# Patient Record
Sex: MALE | Race: WHITE | NOT HISPANIC OR LATINO | Employment: FULL TIME | URBAN - NONMETROPOLITAN AREA
[De-identification: names, ages, dates, MRNs, and addresses within clinical notes are randomized per-mention and may not be internally consistent; named-entity substitution may affect disease eponyms.]

---

## 2022-04-12 RX ORDER — GUANFACINE 4 MG/1
TABLET, EXTENDED RELEASE ORAL
Qty: 30 TABLET | Refills: 5 | OUTPATIENT
Start: 2022-04-12

## 2022-04-15 ENCOUNTER — TELEMEDICINE (OUTPATIENT)
Dept: FAMILY MEDICINE CLINIC | Facility: CLINIC | Age: 27
End: 2022-04-15

## 2022-04-15 VITALS — WEIGHT: 240 LBS | HEIGHT: 69 IN | BODY MASS INDEX: 35.55 KG/M2

## 2022-04-15 DIAGNOSIS — F90.0 ADHD (ATTENTION DEFICIT HYPERACTIVITY DISORDER), INATTENTIVE TYPE: Primary | ICD-10-CM

## 2022-04-15 PROCEDURE — 99213 OFFICE O/P EST LOW 20 MIN: CPT | Performed by: PHYSICIAN ASSISTANT

## 2022-04-15 RX ORDER — GUANFACINE 4 MG/1
1 TABLET, EXTENDED RELEASE ORAL DAILY
Qty: 30 TABLET | Refills: 5 | Status: SHIPPED | OUTPATIENT
Start: 2022-04-15 | End: 2022-10-14 | Stop reason: SDUPTHER

## 2022-04-15 RX ORDER — GUANFACINE 4 MG/1
1 TABLET, EXTENDED RELEASE ORAL DAILY
COMMUNITY
Start: 2022-03-13 | End: 2022-04-15 | Stop reason: SDUPTHER

## 2022-04-15 NOTE — ASSESSMENT & PLAN NOTE
Psychological condition is stable we will continue current treatment plan refill sent to the pharmacy

## 2022-04-15 NOTE — PROGRESS NOTES
"Mode of Visit: Video  Location of patient: home  You have chosen to receive care through a telehealth visit.  Does the patient consent to use a video/audio connection for your medical care today? Yes  The visit included audio and video interaction. No technical issues occurred during this visit.     Chief Complaint  ADD (Patient request medication refills today)    Subjective          Soren Ulloa presents to [facility]  Patient reports today for medication refills.  Patient has attention deficit and states he needs refills of his medication that he takes daily.  Patient reports current regimen is working well.  Patient visit conducted via [email or web address]    Objective   Vital Signs:   Ht 175.3 cm (69\")   Wt 109 kg (240 lb)   BMI 35.44 kg/m²     Physical Exam   Constitutional: He appears well-developed and well-nourished.   HENT:   Head: Normocephalic.   Eyes: Pupils are equal, round, and reactive to light.   Psychiatric: He has a normal mood and affect.     Result Review :                 Assessment and Plan    Diagnoses and all orders for this visit:    1. ADHD (attention deficit hyperactivity disorder), inattentive type (Primary)  Assessment & Plan:  Psychological condition is stable we will continue current treatment plan refill sent to the pharmacy      Other orders  -     guanFACINE HCl ER 4 MG tablet sustained-release 24 hour; Take 1 tablet by mouth Daily for 180 days.  Dispense: 30 tablet; Refill: 5      Follow Up   No follow-ups on file.  Patient was given instructions and counseling regarding his condition or for health maintenance advice. Please see specific information pulled into the AVS if appropriate.       "

## 2022-10-14 ENCOUNTER — TELEMEDICINE (OUTPATIENT)
Dept: FAMILY MEDICINE CLINIC | Facility: CLINIC | Age: 27
End: 2022-10-14

## 2022-10-14 VITALS — BODY MASS INDEX: 37.03 KG/M2 | HEIGHT: 69 IN | WEIGHT: 250 LBS

## 2022-10-14 DIAGNOSIS — F90.0 ADHD (ATTENTION DEFICIT HYPERACTIVITY DISORDER), INATTENTIVE TYPE: Primary | ICD-10-CM

## 2022-10-14 PROCEDURE — 99213 OFFICE O/P EST LOW 20 MIN: CPT | Performed by: PHYSICIAN ASSISTANT

## 2022-10-14 RX ORDER — GUANFACINE 4 MG/1
1 TABLET, EXTENDED RELEASE ORAL DAILY
Qty: 30 TABLET | Refills: 5 | Status: SHIPPED | OUTPATIENT
Start: 2022-10-14 | End: 2023-04-05 | Stop reason: SDUPTHER

## 2022-10-14 RX ORDER — GUANFACINE 4 MG/1
TABLET, EXTENDED RELEASE ORAL
Qty: 30 TABLET | Refills: 5 | OUTPATIENT
Start: 2022-10-14

## 2022-10-14 NOTE — PROGRESS NOTES
"Chief Complaint  ADHD    Subjective  Patient presents during the COVID-19  pandemic/federally declared state of public health emergency.  For today's visit this provider is located at [facility]. Patient was seen today through synchronous audio/video technology using [employer]. Verbal consent was obtained. The patient was located at work. Vitals signs were not obtained due to lack of home monitoring access. Time spent with patient was 15 minutes.        Soren Ulloa presents to [facility]  History of Present Illness patient is being seen today because he needs his ADHD medication refilled he takes guanfacine ER and reports that it works extremely well for him.  He has no other complaints today.  He normally seen by Dr. Meyer but was unable to get an appointment with him until December before his follow-up he would like to get back to seeing Dr. Meyer.    Objective   Vital Signs:   Ht 175.3 cm (69\")   Wt 113 kg (250 lb) Comment: Pt reported vitals  BMI 36.92 kg/m²     Body mass index is 36.92 kg/m².    Review of Systems   Constitutional: Negative for chills, fatigue and fever.   Respiratory: Negative for cough, shortness of breath and wheezing.    Cardiovascular: Negative for chest pain and palpitations.   Neurological: Negative.    Psychiatric/Behavioral: Positive for decreased concentration.       Past History:  Medical History: has a past medical history of Child attention deficit disorder (12/02/2014) and Ventricular septal defect (VSD), membranous.   Surgical History: has no past surgical history on file.   Family History: family history includes Cancer in his mother; Coronary artery disease in an other family member; Diabetes in an other family member; Hypertension in his father and mother.   Social History: reports that he has never smoked. He has never used smokeless tobacco. He reports current alcohol use. Drug use questions deferred to the " physician.      Current Outpatient Medications:   •  guanFACINE HCl ER 4 MG tablet sustained-release 24 hour, Take 1 tablet by mouth Daily for 180 days., Disp: 30 tablet, Rfl: 5    Allergies: Sulfa antibiotics    Physical Exam  Constitutional:       Appearance: Normal appearance.   Neurological:      Mental Status: He is alert and oriented to person, place, and time.   Psychiatric:         Mood and Affect: Mood normal.         Behavior: Behavior normal.          Result Review :                   Assessment and Plan    Diagnoses and all orders for this visit:    1. ADHD (attention deficit hyperactivity disorder), inattentive type (Primary)  Assessment & Plan:  Psychological condition is improving with treatment.  Continue current treatment regimen.  Psychological condition  will be reassessed 6 months, patient will go ahead and schedule that appt. .      Other orders  -     guanFACINE HCl ER 4 MG tablet sustained-release 24 hour; Take 1 tablet by mouth Daily for 180 days.  Dispense: 30 tablet; Refill: 5      Follow Up   Return in about 6 months (around 4/14/2023) for Recheck.  Patient was given instructions and counseling regarding his condition or for health maintenance advice. Please see specific information pulled into the AVS if appropriate.     Claribel Loya PA-C

## 2022-10-14 NOTE — ASSESSMENT & PLAN NOTE
Psychological condition is improving with treatment.  Continue current treatment regimen.  Psychological condition  will be reassessed 6 months, patient will go ahead and schedule that appt. .

## 2023-04-05 ENCOUNTER — OFFICE VISIT (OUTPATIENT)
Dept: FAMILY MEDICINE CLINIC | Facility: CLINIC | Age: 28
End: 2023-04-05
Payer: COMMERCIAL

## 2023-04-05 VITALS
OXYGEN SATURATION: 100 % | DIASTOLIC BLOOD PRESSURE: 98 MMHG | HEART RATE: 62 BPM | HEIGHT: 69 IN | TEMPERATURE: 97.5 F | SYSTOLIC BLOOD PRESSURE: 132 MMHG | WEIGHT: 261.4 LBS | BODY MASS INDEX: 38.72 KG/M2

## 2023-04-05 DIAGNOSIS — F90.0 ADHD (ATTENTION DEFICIT HYPERACTIVITY DISORDER), INATTENTIVE TYPE: Primary | ICD-10-CM

## 2023-04-05 RX ORDER — GUANFACINE 4 MG/1
1 TABLET, EXTENDED RELEASE ORAL DAILY
Qty: 30 TABLET | Refills: 5 | Status: SHIPPED | OUTPATIENT
Start: 2023-04-05 | End: 2023-10-02

## 2023-04-05 NOTE — PROGRESS NOTES
"     Follow Up Office Visit      Date: 2023   Patient Name: Soren Ulloa  : 1995   MRN: [id number]     Chief Complaint:    Chief Complaint   Patient presents with   • ADHD       History of Present Illness: Soren Ulloa is a 27 y.o. male who is here today to follow up with ADHD.  He states that he has been on the guanfacine for many years, and his symptoms have been well controlled.  He denies any negative side effects, including fatigue, weakness, dizziness.  He admits that he does not monitor his blood pressure for any reason.    He is past due for some blood work, but he declines to do it today.      Subjective      Review of Systems:   Review of Systems   Psychiatric/Behavioral: Positive for decreased concentration ( Controlled on current therapy). Negative for depressed mood. The patient is not nervous/anxious.        I have reviewed the patients family history, social history, past medical history, past surgical history and have updated it as appropriate.     Medications:     Current Outpatient Medications:   •  guanFACINE HCl ER 4 MG tablet sustained-release 24 hour, Take 1 tablet by mouth Daily for 180 days., Disp: 30 tablet, Rfl: 5    Allergies:   Allergies   Allergen Reactions   • Sulfa Antibiotics Hives       Objective     Physical Exam: Please see above    Vital Signs:   Vitals:    23 1004   BP: 132/98   BP Location: Left arm   Patient Position: Sitting   Cuff Size: Large Adult   Pulse: 62   Temp: 97.5 °F (36.4 °C)   TempSrc: Temporal   SpO2: 100%   Weight: 119 kg (261 lb 6.4 oz)   Height: 175.3 cm (69\")     Body mass index is 38.6 kg/m².    Physical Exam  Constitutional:       Appearance: Normal appearance. He is obese.   HENT:      Head: Normocephalic and atraumatic.      Right Ear: Tympanic membrane, ear canal and external ear normal.      Left Ear: Tympanic membrane, ear canal and external ear normal.      Nose: Nose normal.      Mouth/Throat:      Mouth: Mucous membranes are " moist.   Eyes:      Extraocular Movements: Extraocular movements intact.      Pupils: Pupils are equal, round, and reactive to light.   Cardiovascular:      Rate and Rhythm: Normal rate and regular rhythm.      Pulses: Normal pulses.      Heart sounds: Normal heart sounds.   Pulmonary:      Effort: Pulmonary effort is normal.      Breath sounds: Normal breath sounds.   Musculoskeletal:      Cervical back: Normal range of motion.   Neurological:      General: No focal deficit present.      Mental Status: He is alert and oriented to person, place, and time.   Psychiatric:         Mood and Affect: Mood normal.         Behavior: Behavior normal.         Thought Content: Thought content normal.         Judgment: Judgment normal.       Assessment / Plan      Assessment/Plan:   Diagnoses and all orders for this visit:    1. ADHD (attention deficit hyperactivity disorder), inattentive type (Primary)  Assessment & Plan:  Psychological condition is improving with treatment.  Continue current treatment regimen.  Psychological condition  will be reassessed In 6 months at regular follow-up..    Orders:  -     guanFACINE HCl ER 4 MG tablet sustained-release 24 hour; Take 1 tablet by mouth Daily for 180 days.  Dispense: 30 tablet; Refill: 5      Follow Up:   Return in about 6 months (around 10/5/2023) for follow-up on medication, annual physical, and blood work.  He agrees to do labs on the next visit.    Hailey Rivera PA-C  [facility]

## 2023-04-05 NOTE — ASSESSMENT & PLAN NOTE
Psychological condition is improving with treatment.  Continue current treatment regimen.  Psychological condition  will be reassessed In 6 months at regular follow-up..

## 2023-10-04 ENCOUNTER — OFFICE VISIT (OUTPATIENT)
Dept: FAMILY MEDICINE CLINIC | Facility: CLINIC | Age: 28
End: 2023-10-04
Payer: COMMERCIAL

## 2023-10-04 VITALS
OXYGEN SATURATION: 100 % | HEART RATE: 62 BPM | SYSTOLIC BLOOD PRESSURE: 124 MMHG | DIASTOLIC BLOOD PRESSURE: 84 MMHG | WEIGHT: 269 LBS | TEMPERATURE: 97.5 F | HEIGHT: 69 IN | BODY MASS INDEX: 39.84 KG/M2

## 2023-10-04 DIAGNOSIS — E66.09 CLASS 2 OBESITY DUE TO EXCESS CALORIES WITHOUT SERIOUS COMORBIDITY WITH BODY MASS INDEX (BMI) OF 39.0 TO 39.9 IN ADULT: ICD-10-CM

## 2023-10-04 DIAGNOSIS — Z13.1 DIABETES MELLITUS SCREENING: ICD-10-CM

## 2023-10-04 DIAGNOSIS — Z00.00 GENERAL MEDICAL EXAM: Primary | ICD-10-CM

## 2023-10-04 DIAGNOSIS — F90.0 ADHD (ATTENTION DEFICIT HYPERACTIVITY DISORDER), INATTENTIVE TYPE: ICD-10-CM

## 2023-10-04 PROBLEM — E66.812 CLASS 2 OBESITY DUE TO EXCESS CALORIES WITHOUT SERIOUS COMORBIDITY WITH BODY MASS INDEX (BMI) OF 39.0 TO 39.9 IN ADULT: Status: ACTIVE | Noted: 2023-10-04

## 2023-10-04 RX ORDER — GUANFACINE 4 MG/1
1 TABLET, EXTENDED RELEASE ORAL DAILY
Qty: 30 TABLET | Refills: 6 | Status: SHIPPED | OUTPATIENT
Start: 2023-10-04

## 2023-10-04 NOTE — ASSESSMENT & PLAN NOTE
Patient's (Body mass index is 39.72 kg/m².) indicates that they are obese (BMI >30) with health conditions that include none . Weight is unchanged. BMI  is above average; BMI management plan is completed. We discussed portion control and increasing exercise.

## 2023-10-04 NOTE — PROGRESS NOTES
"Chief Complaint  Annual Exam and ADD    Subjective    History of Present Illness:  Soren Ulloa is a 28 y.o. male who presents today for physical exam and medication refills.    Having a boy in Dec (Fred Rader)!  Tdap uptodate     He continues to do well on Intuniv (generic guanfacineER) for ongoing ADD.    No problems or side-effects.    No record of fasting labs and we discussed getting labwork uptodate with his physical today -     Discussed vaccination update with flu and covid 19 and he will consider flu at his pharmacy but declines COVID-19 pain    No risk factors for hepC so would like to hold off on hep C screening.       Objective   Vital Signs:   /84   Pulse 62   Temp 97.5 °F (36.4 °C)   Ht 175.3 cm (69\")   Wt 122 kg (269 lb)   SpO2 100%   BMI 39.72 kg/m²     Review of Systems   Constitutional:  Negative for appetite change, chills and fever.   HENT:  Negative for hearing loss.    Eyes:  Negative for blurred vision.   Respiratory:  Negative for chest tightness.    Cardiovascular:  Negative for chest pain.   Gastrointestinal:  Negative for abdominal pain.   Musculoskeletal:  Negative for gait problem.   Skin:  Negative for rash.   Psychiatric/Behavioral:  Positive for decreased concentration. Negative for depressed mood.      Past History:  Medical History: has a past medical history of Child attention deficit disorder (12/02/2014) and Ventricular septal defect (VSD), membranous.   Surgical History: has no past surgical history on file.   Family History: family history includes Cancer in his mother; Coronary artery disease in an other family member; Diabetes in an other family member; Hypertension in his father and mother.   Social History: reports that he has never smoked. He has never used smokeless tobacco. He reports that he does not currently use alcohol. He reports that he does not use drugs.      Current Outpatient Medications:     guanFACINE HCl ER 4 MG tablet sustained-release 24 " hour, Take 1 tablet by mouth Daily., Disp: 30 tablet, Rfl: 6    Allergies: Sulfa antibiotics    Physical Exam  Constitutional:       Appearance: He is obese.   HENT:      Head: Normocephalic.      Right Ear: External ear normal.      Left Ear: External ear normal.      Nose: Nose normal.   Eyes:      Pupils: Pupils are equal, round, and reactive to light.   Cardiovascular:      Rate and Rhythm: Normal rate and regular rhythm.      Heart sounds: Normal heart sounds.   Pulmonary:      Effort: Pulmonary effort is normal.      Breath sounds: Normal breath sounds.   Musculoskeletal:         General: Normal range of motion.      Cervical back: Normal range of motion.   Skin:     General: Skin is warm and dry.   Neurological:      General: No focal deficit present.      Mental Status: He is alert.   Psychiatric:         Mood and Affect: Mood normal.         Behavior: Behavior normal.         Thought Content: Thought content normal.        Result Review                   Assessment and Plan  Diagnoses and all orders for this visit:    1. General medical exam (Primary)  Assessment & Plan:  Discussed together health maintenance and screening along with vaccination options and healthy diet and exercise habits as part of the preventative counseling at their physical exam today.     Orders:  -     CBC Auto Differential; Future  -     Comprehensive Metabolic Panel; Future  -     Lipid Panel; Future  -     TSH; Future  -     T4, Free; Future    2. Diabetes mellitus screening  -     Hemoglobin A1c; Future    3. ADHD (attention deficit hyperactivity disorder), inattentive type  Assessment & Plan:  Psychological condition is improving with treatment.  Continue current treatment regimen.  Psychological condition  will be reassessed at the next regular appointment.    Orders:  -     guanFACINE HCl ER 4 MG tablet sustained-release 24 hour; Take 1 tablet by mouth Daily.  Dispense: 30 tablet; Refill: 6    4. Class 2 obesity due to excess  calories without serious comorbidity with body mass index (BMI) of 39.0 to 39.9 in adult  Assessment & Plan:  Patient's (Body mass index is 39.72 kg/m².) indicates that they are obese (BMI >30) with health conditions that include none . Weight is unchanged. BMI  is above average; BMI management plan is completed. We discussed portion control and increasing exercise.                     Follow Up  Return in about 6 months (around 4/4/2024) for Med recheck.    Soren Meyer MD

## 2024-01-09 DIAGNOSIS — F90.0 ADHD (ATTENTION DEFICIT HYPERACTIVITY DISORDER), INATTENTIVE TYPE: ICD-10-CM

## 2024-01-09 RX ORDER — GUANFACINE 4 MG/1
1 TABLET, EXTENDED RELEASE ORAL DAILY
Qty: 90 TABLET | Refills: 0 | Status: SHIPPED | OUTPATIENT
Start: 2024-01-09

## 2024-04-04 ENCOUNTER — OFFICE VISIT (OUTPATIENT)
Dept: FAMILY MEDICINE CLINIC | Facility: CLINIC | Age: 29
End: 2024-04-04
Payer: COMMERCIAL

## 2024-04-04 VITALS
DIASTOLIC BLOOD PRESSURE: 80 MMHG | SYSTOLIC BLOOD PRESSURE: 130 MMHG | BODY MASS INDEX: 41.05 KG/M2 | OXYGEN SATURATION: 96 % | HEART RATE: 85 BPM | WEIGHT: 278 LBS

## 2024-04-04 DIAGNOSIS — E66.01 CLASS 3 SEVERE OBESITY DUE TO EXCESS CALORIES WITHOUT SERIOUS COMORBIDITY WITH BODY MASS INDEX (BMI) OF 40.0 TO 44.9 IN ADULT: ICD-10-CM

## 2024-04-04 DIAGNOSIS — F90.0 ADHD (ATTENTION DEFICIT HYPERACTIVITY DISORDER), INATTENTIVE TYPE: Primary | Chronic | ICD-10-CM

## 2024-04-04 PROBLEM — E66.813 CLASS 3 SEVERE OBESITY DUE TO EXCESS CALORIES WITHOUT SERIOUS COMORBIDITY WITH BODY MASS INDEX (BMI) OF 40.0 TO 44.9 IN ADULT: Status: ACTIVE | Noted: 2023-10-04

## 2024-04-04 RX ORDER — GUANFACINE 4 MG/1
1 TABLET, EXTENDED RELEASE ORAL DAILY
Qty: 90 TABLET | Refills: 1 | Status: SHIPPED | OUTPATIENT
Start: 2024-04-04

## 2024-04-04 NOTE — PROGRESS NOTES
Chief Complaint  ADHD followup    Subjective    History of Present Illness:  Soren Ulloa is a 28 y.o. male who presents today for 6 month followup visit and medication refills.    Doing well since last visit and physical together in Oct 2023.    They did have the birth of their first child in Dec - they had a boy-Fred Rader.  Tdap uptodate    He continues to do well on Intuniv (generic guanfacineER) for ongoing ADD.    No problems or side-effects.    No past fasting labs and we discussed getting labwork uptodate at his physical.  Orders in chart for fasting labs and he will consider getting them uptodate int he future.         Objective   Vital Signs:   /80   Pulse 85   Wt 126 kg (278 lb)   SpO2 96%   BMI 41.05 kg/m²     Review of Systems   Constitutional:  Negative for appetite change, chills and fever.   HENT:  Negative for hearing loss.    Eyes:  Negative for blurred vision.   Respiratory:  Negative for chest tightness.    Cardiovascular:  Negative for chest pain.   Gastrointestinal:  Negative for abdominal pain.   Musculoskeletal:  Negative for gait problem.   Skin:  Negative for rash.   Psychiatric/Behavioral:  Positive for decreased concentration. Negative for depressed mood.        Past History:  Medical History: has a past medical history of Child attention deficit disorder (12/02/2014) and Ventricular septal defect (VSD), membranous.   Surgical History: has no past surgical history on file.   Family History: family history includes Cancer in his mother; Coronary artery disease in an other family member; Diabetes in an other family member; Hypertension in his father and mother.   Social History: reports that he has never smoked. He has never used smokeless tobacco. He reports that he does not currently use alcohol. He reports that he does not use drugs.      Current Outpatient Medications:     guanFACINE HCl ER 4 MG tablet sustained-release 24 hour, Take 1 tablet by mouth Daily., Disp: 90  tablet, Rfl: 1    Allergies: Sulfa antibiotics    Physical Exam  Constitutional:       Appearance: He is obese.   HENT:      Head: Normocephalic.      Right Ear: External ear normal.      Left Ear: External ear normal.      Nose: Nose normal.   Eyes:      Pupils: Pupils are equal, round, and reactive to light.   Cardiovascular:      Rate and Rhythm: Normal rate and regular rhythm.      Heart sounds: Normal heart sounds.   Pulmonary:      Effort: Pulmonary effort is normal.      Breath sounds: Normal breath sounds.   Musculoskeletal:         General: Normal range of motion.      Cervical back: Normal range of motion.   Skin:     General: Skin is warm and dry.   Neurological:      General: No focal deficit present.      Mental Status: He is alert.   Psychiatric:         Mood and Affect: Mood normal.         Behavior: Behavior normal.         Thought Content: Thought content normal.          Result Review                   Assessment and Plan  Diagnoses and all orders for this visit:    1. ADHD (attention deficit hyperactivity disorder), inattentive type (Primary)  Assessment & Plan:  Psychological condition is improving with treatment.  Continue current treatment regimen.  Psychological condition  will be reassessed at the next regular appointment.    Orders:  -     guanFACINE HCl ER 4 MG tablet sustained-release 24 hour; Take 1 tablet by mouth Daily.  Dispense: 90 tablet; Refill: 1    2. Class 3 severe obesity due to excess calories without serious comorbidity with body mass index (BMI) of 40.0 to 44.9 in adult  Assessment & Plan:  Patient's (Body mass index is 41.05 kg/m².) indicates that they are obese (BMI >30) with health conditions that include none . Weight is unchanged. BMI  is above average; BMI management plan is completed. We discussed portion control and increasing exercise.                      Follow Up  Return in about 25 weeks (around 9/26/2024) for Annual physical.    Soren Meyer MD

## 2024-04-04 NOTE — ASSESSMENT & PLAN NOTE
Patient's (Body mass index is 41.05 kg/m².) indicates that they are obese (BMI >30) with health conditions that include none . Weight is unchanged. BMI  is above average; BMI management plan is completed. We discussed portion control and increasing exercise.

## 2024-09-27 ENCOUNTER — OFFICE VISIT (OUTPATIENT)
Dept: FAMILY MEDICINE CLINIC | Facility: CLINIC | Age: 29
End: 2024-09-27
Payer: COMMERCIAL

## 2024-09-27 VITALS
BODY MASS INDEX: 40.14 KG/M2 | HEIGHT: 69 IN | DIASTOLIC BLOOD PRESSURE: 84 MMHG | SYSTOLIC BLOOD PRESSURE: 112 MMHG | HEART RATE: 98 BPM | OXYGEN SATURATION: 100 % | WEIGHT: 271 LBS

## 2024-09-27 DIAGNOSIS — F90.0 ADHD (ATTENTION DEFICIT HYPERACTIVITY DISORDER), INATTENTIVE TYPE: Chronic | ICD-10-CM

## 2024-09-27 DIAGNOSIS — E66.01 CLASS 3 SEVERE OBESITY DUE TO EXCESS CALORIES WITHOUT SERIOUS COMORBIDITY WITH BODY MASS INDEX (BMI) OF 40.0 TO 44.9 IN ADULT: ICD-10-CM

## 2024-09-27 DIAGNOSIS — Z13.1 DIABETES MELLITUS SCREENING: ICD-10-CM

## 2024-09-27 DIAGNOSIS — Z00.00 GENERAL MEDICAL EXAM: Primary | ICD-10-CM

## 2024-09-27 PROCEDURE — 99395 PREV VISIT EST AGE 18-39: CPT | Performed by: FAMILY MEDICINE

## 2024-09-27 RX ORDER — GUANFACINE 4 MG/1
1 TABLET, EXTENDED RELEASE ORAL DAILY
Qty: 90 TABLET | Refills: 1 | Status: SHIPPED | OUTPATIENT
Start: 2024-09-27

## 2025-03-24 ENCOUNTER — OFFICE VISIT (OUTPATIENT)
Dept: FAMILY MEDICINE CLINIC | Facility: CLINIC | Age: 30
End: 2025-03-24
Payer: COMMERCIAL

## 2025-03-24 VITALS
WEIGHT: 272.8 LBS | HEIGHT: 69 IN | DIASTOLIC BLOOD PRESSURE: 88 MMHG | OXYGEN SATURATION: 97 % | SYSTOLIC BLOOD PRESSURE: 128 MMHG | HEART RATE: 94 BPM | BODY MASS INDEX: 40.4 KG/M2

## 2025-03-24 DIAGNOSIS — E66.813 CLASS 3 SEVERE OBESITY DUE TO EXCESS CALORIES WITHOUT SERIOUS COMORBIDITY WITH BODY MASS INDEX (BMI) OF 40.0 TO 44.9 IN ADULT: ICD-10-CM

## 2025-03-24 DIAGNOSIS — M25.50 POLYARTHRALGIA: ICD-10-CM

## 2025-03-24 DIAGNOSIS — E66.01 CLASS 3 SEVERE OBESITY DUE TO EXCESS CALORIES WITHOUT SERIOUS COMORBIDITY WITH BODY MASS INDEX (BMI) OF 40.0 TO 44.9 IN ADULT: ICD-10-CM

## 2025-03-24 DIAGNOSIS — F90.0 ADHD (ATTENTION DEFICIT HYPERACTIVITY DISORDER), INATTENTIVE TYPE: Primary | Chronic | ICD-10-CM

## 2025-03-24 DIAGNOSIS — Z82.61 FAMILY HISTORY OF RHEUMATOID ARTHRITIS: ICD-10-CM

## 2025-03-24 PROCEDURE — 99214 OFFICE O/P EST MOD 30 MIN: CPT | Performed by: FAMILY MEDICINE

## 2025-03-24 RX ORDER — GUANFACINE 4 MG/1
1 TABLET, EXTENDED RELEASE ORAL DAILY
Qty: 90 TABLET | Refills: 1 | Status: SHIPPED | OUTPATIENT
Start: 2025-03-24

## 2025-03-24 NOTE — PROGRESS NOTES
"Chief Complaint  ADHD/Intuniv Refills    Subjective    History of Present Illness:  Soren Ulloa is a 29 y.o. male who presents today for followup visit and medication refills.    Doing well since last visit together in Sept for his physical exam.     Continues to enjoy being a dad!  They did have the birth of their first child in Dec 2023 - they had a boy-Fred Rader.  Tdap uptodate    Declines flu shot     He continues to do well on Intuniv (generic guanfacineER) for ongoing ADD.    No problems or side-effects.    Continues to enjoy his job as a .     No past fasting labs and we discussed getting labwork uptodate at his physical.  Orders in chart for fasting labs and he will consider getting them uptodate int he future.     Family history of OA and RA - discussed testing - he will consider at followup but wants to hold off on rheum lab workup and hand xrays at this time.       Objective   Vital Signs:   /88 (BP Location: Left arm, Patient Position: Sitting, Cuff Size: Large Adult)   Pulse 94   Ht 175.3 cm (69.02\")   Wt 124 kg (272 lb 12.8 oz)   SpO2 97%   BMI 40.27 kg/m²     Review of Systems   Constitutional:  Negative for chills, diaphoresis, fatigue and fever.   HENT:  Negative for congestion, sore throat and swollen glands.    Respiratory:  Negative for cough.    Cardiovascular:  Negative for chest pain.   Gastrointestinal:  Negative for abdominal pain, nausea and vomiting.   Genitourinary:  Negative for dysuria.   Musculoskeletal:  Positive for arthralgias. Negative for myalgias and neck pain.   Skin:  Negative for rash.   Neurological:  Negative for weakness and numbness.       Past History:  Medical History: has a past medical history of Child attention deficit disorder (12/02/2014) and Ventricular septal defect (VSD), membranous.   Surgical History: has no past surgical history on file.   Family History: family history includes Cancer in his mother; Coronary artery disease in an other " family member; Diabetes in an other family member; Hypertension in his father and mother.   Social History: reports that he has never smoked. He has never used smokeless tobacco. He reports that he does not currently use alcohol. He reports that he does not use drugs.      Current Outpatient Medications:     guanFACINE HCl ER 4 MG tablet sustained-release 24 hour, Take 1 tablet by mouth Daily., Disp: 90 tablet, Rfl: 1    Allergies: Sulfa antibiotics    Physical Exam  Constitutional:       Appearance: He is obese.   HENT:      Head: Normocephalic.      Right Ear: External ear normal.      Left Ear: External ear normal.      Nose: Nose normal.   Eyes:      Pupils: Pupils are equal, round, and reactive to light.   Cardiovascular:      Rate and Rhythm: Normal rate and regular rhythm.      Heart sounds: Normal heart sounds.   Pulmonary:      Effort: Pulmonary effort is normal.      Breath sounds: Normal breath sounds.   Musculoskeletal:      Cervical back: Normal range of motion.      Comments: OA changes, no inflammatory joint changes or tophi.     Skin:     General: Skin is warm and dry.   Neurological:      General: No focal deficit present.      Mental Status: He is alert.   Psychiatric:         Mood and Affect: Mood normal.         Behavior: Behavior normal.         Thought Content: Thought content normal.          Result Review                   Assessment and Plan  Diagnoses and all orders for this visit:    1. ADHD (attention deficit hyperactivity disorder), inattentive type (Primary)  Assessment & Plan:  Psychological condition is improving with treatment.  Continue current treatment regimen.  Psychological condition  will be reassessed at the next regular appointment.    Orders:  -     guanFACINE HCl ER 4 MG tablet sustained-release 24 hour; Take 1 tablet by mouth Daily.  Dispense: 90 tablet; Refill: 1    2. Polyarthralgia  Assessment & Plan:  No evid for RA/inflammatory arthritis on exam    Discussed  lab/imaging workup and he will consider in Sept at his physical but wants to wait today      3. Family history of rheumatoid arthritis  Assessment & Plan:  See above      4. Class 3 severe obesity due to excess calories without serious comorbidity with body mass index (BMI) of 40.0 to 44.9 in adult  Assessment & Plan:  Patient's (Body mass index is 40.27 kg/m².) indicates that they are obese (BMI >30) with health conditions that include none . Weight is unchanged. BMI  is above average; BMI management plan is completed. We discussed portion control and increasing exercise.                      Follow Up  Return in about 6 months (around 9/24/2025) for Annual physical.    Soren Meyer MD

## 2025-03-24 NOTE — ASSESSMENT & PLAN NOTE
Patient's (Body mass index is 40.27 kg/m².) indicates that they are obese (BMI >30) with health conditions that include none . Weight is unchanged. BMI  is above average; BMI management plan is completed. We discussed portion control and increasing exercise.    Statement Selected

## 2025-03-24 NOTE — ASSESSMENT & PLAN NOTE
No evid for RA/inflammatory arthritis on exam    Discussed lab/imaging workup and he will consider in Sept at his physical but wants to wait today